# Patient Record
Sex: FEMALE | Race: WHITE
[De-identification: names, ages, dates, MRNs, and addresses within clinical notes are randomized per-mention and may not be internally consistent; named-entity substitution may affect disease eponyms.]

---

## 2019-09-12 ENCOUNTER — HOSPITAL ENCOUNTER (OUTPATIENT)
Dept: HOSPITAL 25 - OREAST | Age: 59
Discharge: HOME | End: 2019-09-12
Attending: ORTHOPAEDIC SURGERY
Payer: COMMERCIAL

## 2019-09-12 VITALS — DIASTOLIC BLOOD PRESSURE: 78 MMHG | SYSTOLIC BLOOD PRESSURE: 115 MMHG

## 2019-09-12 DIAGNOSIS — G56.01: Primary | ICD-10-CM

## 2019-09-12 NOTE — OP
DATE OF OPERATION:  09/12/19 [facility]

 

DATE OF BIRTH:  [birth year]

 

SURGEON:  Wolf Sanders MD

 

ASSISTANT:  FABIENNE Henao

 

ANESTHESIOLOGIST:  Dr. Skinner.

 

ANESTHESIA:  General.

 

PRE-OP DIAGNOSIS:  Right carpal tunnel syndrome.

 

POST-OP DIAGNOSIS:  Right carpal tunnel syndrome.

 

OPERATIVE PROCEDURE:  Right endoscopic carpal tunnel release.

 

INDICATIONS:  Ms. Marcos has carpal tunnel syndrome.  We talked about her 
options.  She wanted to proceed with surgery.

 

ESTIMATED BLOOD LOSS:  2 mL.

 

COMPLICATIONS:  None.

 

FINDINGS:  See above and below.

 

DESCRIPTION OF PROCEDURE:  Ms. Marcos was seen in the preoperative holding area. 
The correct site, side, and procedure were identified.  We came back to the 
operating room, the arm was prepped and draped in the usual fashion and a time-
out was performed.

 

The arm was exsanguinated and the tourniquet inflated to 225 mmHg.  I made a 1 
cm transverse incision just ulnar to the palmaris longus tendon just proximal 
to the wrist flexion crease.  Dissection was carried down and the distal 
antebrachial fascia was split transversely bluntly with the tenotomy scissors.  
A two-prong skin hook was placed into the fascia, the synovial stripper 
followed by the dilators and then a Q-tip were used to dilate and dry out the 
carpal tunnel.  The [employer] endoscopic carpal tunnel system was introduced 
just radial to the hook of the hamate.  Once I had it in the appropriate 
location, the blade was elevated and the release was carried out from distal to 
proximal.  Once I had fully completed the release, I released the distal 
antebrachial fascia proximally with the tenotomy scissors.  At this point, 
everything was looking good.  The wound was irrigated out and closed with 4-0 
Prolene suture and a Steri-Strip was applied.  Soft dressings were applied and 
she was taken to the recovery room in stable condition.

 

 [id number]/CPS #: [id number]

[facility] HospitalJANELLE